# Patient Record
Sex: FEMALE | Race: BLACK OR AFRICAN AMERICAN | NOT HISPANIC OR LATINO | Employment: OTHER | URBAN - METROPOLITAN AREA
[De-identification: names, ages, dates, MRNs, and addresses within clinical notes are randomized per-mention and may not be internally consistent; named-entity substitution may affect disease eponyms.]

---

## 2017-01-16 NOTE — H&P
Date: 2017    Patient: Rachel Whitman    : 1945       History:   Rachel Whitman is a 71 yr/o female resident at [facility] who presents for evaluation of hematemesis and Hemoccult-positive stool. She has had intermittently bouts of nausea and emesis that has a coffee ground appearance for the past 3 weeks. She had a syncopal episode requiring evaluation in the hospital a few weeks ago. She states she is eating well. She is having frequent normal bowel movements. She had 2 stools checked one of which was positive for occult blood. She has no known history of ulcer disease. She currently takes pantoprozole. She does not take any NSAIDs. She does take aspirin 81 milligrams daily.         The following portions of the patient's history were reviewed and updated as appropriate: allergies, current medications, past family history, past medical history, past social history, past surgical history and problem list.              Past Medical History   Diagnosis Date   • Diabetes mellitus (HCC)    • Epistaxis        Seen by    • Expressive aphasia        after stroke   • Hemiparesis (HCC)        right side after left ARVIN stroke with left carotid stenosis   • Hemiparesis due to recent stroke (HCC)        evaluated by Dr. 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   • Vitamin B12 deficiency    • Vitamin D deficiency                 Past Surgical History   Procedure Laterality Date   • Colonoscopy          refused any more colonoscopy         Current Medications:          Current Outpatient Prescriptions   Medication Sig Dispense Refill   • amLODIPine (NORVASC) 5 mg tablet Take 1 tablet by mouth  daily 30 tablet 5   • aspirin EC 81 MG EC tablet Take 81 mg by mouth daily       • atorvastatin (LIPITOR) 40 MG tablet Take 1 tablet by mouth daily 30 tablet 6   • baclofen (LIORESAL) 10 MG tablet Take 1 tablet by mouth 4 (four) times daily 120 tablet 0   • Cranberry 450 MG TABS Take 1 tablet by mouth daily 30 each 6   • famotidine (PEPCID) 20 MG tablet Take 1 tablet by mouth 2 (two) times daily 60 tablet 5   • glimepiride (AMARYL) 4 MG tablet Take 1 tablet by mouth 2 (two) times daily before meals 180 tablet 2   • Lactobacillus (ACIDOPHILUS) CHEW Chew 1 each by mouth 2 (two) times daily 100 each 3   • Lactobacillus (FLORANEX) Take 1 tablet by mouth 2 (two) times daily 60 tablet 5   • losartan (COZAAR) 100 MG tablet Take 1 tablet by mouth daily 30 tablet 5   • metFORMIN (GLUCOPHAGE) 1000 MG tablet Take 1 tablet by mouth 2 (two) times daily with meals 60 tablet 3   • ONE TOUCH ULTRA TEST test strip USE TO TEST BLOOD SUGAR TWO TIMES A  each 11   • oxybutynin (DITROPAN-XL) 5 MG 24 hr tablet Take 1 tablet by mouth daily 30 tablet 0   • potassium chloride SA (K-DUR,KLOR-CON) 20 MEQ tablet Take 1 tablet by mouth daily for 90 days 90 tablet 0   • senna-docusate (PERICOLACE) 8.6-50 MG Take 1 tablet by mouth daily 90 tablet 3   • vitamin B-12 (CYANOCOBALAMIN) 1000 MCG tablet Inject 1,000 mcg as directed daily          No current facility-administered medications for this visit.       Allergies: Lisinopril         Constitutional: Denies fever or chills   Eyes: Denies change in visual acuity   HENT: Denies nasal congestion or sore throat   Respiratory: Denies cough or shortness of breath   Cardiovascular: Denies chest pain or edema   GI: Denies abdominal pain, nausea, vomiting, bloody stools or diarrhea   : Denies dysuria   Musculoskeletal: She is unable to walk very effectively and mostly stays in a wheelchair  Integument: Denies rash   Neurologic: She denies headache. She has had a stroke and she is weak  Endocrine:  "Denies polyuria or polydipsia   Lymphatic: Denies swollen glands   Psychiatric: Denies depression or anxiety               Family History   Problem Relation Age of Onset   • Cancer, Other or Unknown Type Mother     • Cancer, Other or Unknown Type Father        Social  Social History            Social History   • Marital status:        Spouse name: N/A   • Number of children: N/A   • Years of education: N/A            Social History Main Topics   • Smoking status: Light Tobacco Smoker       Packs/day: 0.25       Start date: 5/19/1961   • Smokeless tobacco: Never Used   • Alcohol use No   • Drug use: No   • Sexual activity: Not Asked           Other Topics Concern   • None      Social History Narrative         Physical Examination:   Pulse 82 Resp 12 Ht 5' 6\" (1.676 m) Wt 186 lb (84.4 kg) SpO2 98% BMI 30.02 kg/m2     General - well developed  female, no distress, appears stated age.  HEENT - pupils are equal, conjunctiva are pink, sclera are non-icteric.  Mouth - mucous membranes are moist, speech is normal with no hoarseness.  Neck - supple, no adenopathy or masses, no JVD. The thyroid is not palpable  Chest - Normal, comfortable respiratory effort. No accessory muscle use. clear.no rhonchi. No rales.  Heart - regular rate and rhythm. No murmur, no gallop  Abdomen - soft, non-distended, no masses. No hepatosplenomegaly, non-tender,   Hernia is not present   Ext - no edema or cyanosis, capillary refill is brisk.  Skin - warm to touch, no diaphoresis. No jaundice  Gait is normal  Speech and memory seem normal     Labs were reviewed her hemoglobin was 9.1 2 days ago     Impression:  probably mild upper gi bleeding, could be peptic ulcer disease, hiatal hernia, or other source     Plan:     Egd. Her family has been contacted by the [employer] personnel and they are agreeable     CC: Jayashree Gilbert MD Varghese, Regi, MD    "

## 2017-01-17 ENCOUNTER — ANESTHESIA EVENT (OUTPATIENT)
Dept: GASTROENTEROLOGY | Facility: HOSPITAL | Age: 72
End: 2017-01-17

## 2017-01-17 ENCOUNTER — ANESTHESIA (OUTPATIENT)
Dept: GASTROENTEROLOGY | Facility: HOSPITAL | Age: 72
End: 2017-01-17

## 2017-01-17 ENCOUNTER — HOSPITAL ENCOUNTER (OUTPATIENT)
Facility: HOSPITAL | Age: 72
Setting detail: HOSPITAL OUTPATIENT SURGERY
Discharge: NURSING, REHAB OR LONG-TERM CARE | End: 2017-01-17
Attending: SURGERY | Admitting: SURGERY

## 2017-01-17 VITALS
OXYGEN SATURATION: 95 % | DIASTOLIC BLOOD PRESSURE: 54 MMHG | RESPIRATION RATE: 16 BRPM | HEART RATE: 56 BPM | HEIGHT: 66 IN | WEIGHT: 176 LBS | TEMPERATURE: 97.8 F | BODY MASS INDEX: 28.28 KG/M2 | SYSTOLIC BLOOD PRESSURE: 116 MMHG

## 2017-01-17 DIAGNOSIS — K92.2 GI BLEED: ICD-10-CM

## 2017-01-17 LAB
DEPRECATED RDW RBC AUTO: 49.3 FL (ref 37–54)
ERYTHROCYTE [DISTWIDTH] IN BLOOD BY AUTOMATED COUNT: 14.3 % (ref 11.7–13)
GLUCOSE BLDC GLUCOMTR-MCNC: 128 MG/DL (ref 70–130)
HCT VFR BLD AUTO: 33.8 % (ref 35.6–45.5)
HGB BLD-MCNC: 10.1 G/DL (ref 11.9–15.5)
MCH RBC QN AUTO: 28.4 PG (ref 26.9–32)
MCHC RBC AUTO-ENTMCNC: 29.9 G/DL (ref 32.4–36.3)
MCV RBC AUTO: 94.9 FL (ref 80.5–98.2)
PLATELET # BLD AUTO: 301 10*3/MM3 (ref 140–500)
PMV BLD AUTO: 10.5 FL (ref 6–12)
RBC # BLD AUTO: 3.56 10*6/MM3 (ref 3.9–5.2)
WBC NRBC COR # BLD: 7.72 10*3/MM3 (ref 4.5–10.7)

## 2017-01-17 PROCEDURE — 88305 TISSUE EXAM BY PATHOLOGIST: CPT | Performed by: SURGERY

## 2017-01-17 PROCEDURE — 88312 SPECIAL STAINS GROUP 1: CPT | Performed by: SURGERY

## 2017-01-17 PROCEDURE — 25010000002 PROPOFOL 10 MG/ML EMULSION: Performed by: ANESTHESIOLOGY

## 2017-01-17 PROCEDURE — 82962 GLUCOSE BLOOD TEST: CPT

## 2017-01-17 PROCEDURE — 85027 COMPLETE CBC AUTOMATED: CPT | Performed by: SURGERY

## 2017-01-17 RX ORDER — PROPOFOL 10 MG/ML
VIAL (ML) INTRAVENOUS CONTINUOUS PRN
Status: DISCONTINUED | OUTPATIENT
Start: 2017-01-17 | End: 2017-01-17 | Stop reason: SURG

## 2017-01-17 RX ORDER — SENNA AND DOCUSATE SODIUM 50; 8.6 MG/1; MG/1
1 TABLET, FILM COATED ORAL DAILY
COMMUNITY

## 2017-01-17 RX ORDER — SODIUM CHLORIDE, SODIUM LACTATE, POTASSIUM CHLORIDE, CALCIUM CHLORIDE 600; 310; 30; 20 MG/100ML; MG/100ML; MG/100ML; MG/100ML
30 INJECTION, SOLUTION INTRAVENOUS CONTINUOUS PRN
Status: DISCONTINUED | OUTPATIENT
Start: 2017-01-17 | End: 2017-01-17 | Stop reason: HOSPADM

## 2017-01-17 RX ORDER — AMLODIPINE BESYLATE 10 MG/1
10 TABLET ORAL DAILY
COMMUNITY
End: 2017-08-01

## 2017-01-17 RX ORDER — METOCLOPRAMIDE 10 MG/1
10 TABLET ORAL
COMMUNITY
End: 2017-07-27

## 2017-01-17 RX ORDER — PANTOPRAZOLE SODIUM 40 MG/1
40 TABLET, DELAYED RELEASE ORAL DAILY
COMMUNITY

## 2017-01-17 RX ORDER — SODIUM CHLORIDE 0.9 % (FLUSH) 0.9 %
1-10 SYRINGE (ML) INJECTION AS NEEDED
Status: DISCONTINUED | OUTPATIENT
Start: 2017-01-17 | End: 2017-01-17 | Stop reason: HOSPADM

## 2017-01-17 RX ORDER — PROPOFOL 10 MG/ML
VIAL (ML) INTRAVENOUS AS NEEDED
Status: DISCONTINUED | OUTPATIENT
Start: 2017-01-17 | End: 2017-01-17 | Stop reason: SURG

## 2017-01-17 RX ORDER — FERROUS SULFATE 325(65) MG
325 TABLET ORAL
COMMUNITY

## 2017-01-17 RX ADMIN — SODIUM CHLORIDE, POTASSIUM CHLORIDE, SODIUM LACTATE AND CALCIUM CHLORIDE: 600; 310; 30; 20 INJECTION, SOLUTION INTRAVENOUS at 08:09

## 2017-01-17 RX ADMIN — SODIUM CHLORIDE, POTASSIUM CHLORIDE, SODIUM LACTATE AND CALCIUM CHLORIDE 30 ML/HR: 600; 310; 30; 20 INJECTION, SOLUTION INTRAVENOUS at 08:02

## 2017-01-17 RX ADMIN — PROPOFOL 100 MG: 10 INJECTION, EMULSION INTRAVENOUS at 08:05

## 2017-01-17 RX ADMIN — PROPOFOL 100 MCG/KG/MIN: 10 INJECTION, EMULSION INTRAVENOUS at 08:05

## 2017-01-17 NOTE — IP AVS SNAPSHOT
AFTER VISIT SUMMARY             Rachel Whitman           About your hospitalization     You were admitted on:  January 17, 2017 You last received care in the:  [facility] ENDO SUITES       Procedures & Surgeries      Procedure(s) (LRB):  ESOPHAGOGASTRODUODENOSCOPY WITH BIOPSIES (N/A)     1/17/2017     Surgeon(s):  Ignacio Johnson MD  -------------------      Medications    If you or your caregiver advised us that you are currently taking a medication and that medication is marked below as “Resume”, this simply indicates that we have reviewed those medications to make sure our new therapy recommendations do not interfere.  If you have concerns about medications other than those new ones which we are prescribing today, please consult the physician who prescribed them (or your primary physician).  Our review of your home medications is not meant to indicate that we are directing their use.             Your Medications      ASK your doctor about these medications     acidophilus tablet tablet   Take 1 tablet by mouth 2 (Two) Times a Day.           amLODIPine 10 MG tablet   Take 10 mg by mouth Daily.   Commonly known as:  NORVASC           apixaban 5 MG tablet tablet   Take 5 mg by mouth 2 (Two) Times a Day.   Commonly known as:  ELIQUIS           aspirin 81 MG EC tablet   Take 81 mg by mouth Daily.           baclofen 10 MG tablet   Take 10 mg by mouth.   Commonly known as:  LIORESAL           ferrous sulfate 325 (65 FE) MG tablet   Take 325 mg by mouth Daily With Breakfast.           losartan 100 MG tablet   Take 100 mg by mouth Daily.   Commonly known as:  COZAAR           metoclopramide 10 MG tablet   Take 10 mg by mouth 4 (Four) Times a Day Before Meals & at Bedtime.   Commonly known as:  REGLAN           pantoprazole 40 MG EC tablet   Take 40 mg by mouth Daily.   Commonly known as:  PROTONIX           sennosides-docusate sodium 8.6-50 MG tablet   Take 1 tablet by mouth Daily.   Commonly known as:   SENOKOT-S                      Your Medications      ASK your doctor about these medications           Morning Noon Evening Bedtime As Needed    acidophilus tablet tablet   Take 1 tablet by mouth 2 (Two) Times a Day.                                amLODIPine 10 MG tablet   Take 10 mg by mouth Daily.   Commonly known as:  NORVASC                                apixaban 5 MG tablet tablet   Take 5 mg by mouth 2 (Two) Times a Day.   Commonly known as:  ELIQUIS                                aspirin 81 MG EC tablet   Take 81 mg by mouth Daily.                                baclofen 10 MG tablet   Take 10 mg by mouth.   Commonly known as:  LIORESAL                                ferrous sulfate 325 (65 FE) MG tablet   Take 325 mg by mouth Daily With Breakfast.                                losartan 100 MG tablet   Take 100 mg by mouth Daily.   Commonly known as:  COZAAR                                metoclopramide 10 MG tablet   Take 10 mg by mouth 4 (Four) Times a Day Before Meals & at Bedtime.   Commonly known as:  REGLAN                                pantoprazole 40 MG EC tablet   Take 40 mg by mouth Daily.   Commonly known as:  PROTONIX                                sennosides-docusate sodium 8.6-50 MG tablet   Take 1 tablet by mouth Daily.   Commonly known as:  SENOKOT-S                                         Instructions for After Discharge          Discharge Instructions       For the next 24 hours patient needs to be with a responsible adult.    For 24 hours DO NOT drive, operate machinery, appliances, drink alcohol, make important decisions or sign legal documents.    Start with a light or bland diet and advance to previous diet as tolerated.    Follow recommendations on procedure report provided by your doctor.    Call Dr Johnson for problems [phone]    Problems may include but not limited to: large amounts of bleeding, trouble breathing, repeated vomiting, severe unrelieved pain, fever or chills.         Discharge References/Attachments     ESOPHAGOGASTRODUODENOSCOPY, CARE AFTER (ENGLISH)    GASTRITIS, ADULT (ENGLISH)    HIATAL HERNIA (ENGLISH)       Follow-ups for After Discharge        [employer] Signup     [facility] resmio allows you to send messages to your doctor, view your test results, renew your prescriptions, schedule appointments, and more. To sign up, go to [employer] and click on the Sign Up Now link in the New User? box. Enter your [facility] Activation Code exactly as it appears below along with the last four digits of your Social Security Number and your Date of Birth () to complete the sign-up process. If you do not sign up before the expiration date, you must request a new code.    [facility] Activation Code: [email or web address]  Expires: 2017  5:38 AM    If you have questions, you can email [email or web address] or call [phone] to talk to our [facility] staff. Remember, [facility] is NOT to be used for urgent needs. For medical emergencies, dial 911.           Summary of Your Hospitalization        Reason for Hospitalization     Your primary diagnosis was:  Not on File      Care Providers     Provider Service Role Specialty    Ignacio Johnson MD Surgery Attending Provider General Surgery    Ignacio Johnson MD Surgery Surgeon  General Surgery      Your Allergies  Date Reviewed: 2017    No active allergies      Pending Labs     Order Current Status    Tissue Exam Collected (17 0818)      Patient Belongings Returned     Document Return of Belongings Flowsheet     Were the patient bedside belongings sent home?   --   Belongings Retrieved from Security & Sent Home   --    Belongings Sent to Safe   --   Medications Retrieved from Pharmacy & Sent Home   --              More Information      Esophagogastroduodenoscopy, Care After  Refer to this sheet in the next few weeks. These instructions provide you with information about caring for yourself after your  procedure. Your health care provider may also give you more specific instructions. Your treatment has been planned according to current medical practices, but problems sometimes occur. Call your health care provider if you have any problems or questions after your procedure.  WHAT TO EXPECT AFTER THE PROCEDURE  After your procedure, it is typical to feel:  · Soreness in your throat.  · Pain with swallowing.  · Sick to your stomach (nauseous).  · Bloated.  · Dizzy.  · Fatigued.  HOME CARE INSTRUCTIONS  · Do not drive or operate machinery until directed by your health care provider.  · Take medicines only as directed by your health care provider.  SEEK MEDICAL CARE IF:   · You cannot stop coughing.  · You are not urinating at all or less than usual.  SEEK IMMEDIATE MEDICAL CARE IF:  · You have difficulty swallowing.  · You cannot eat or drink.  · You have worsening throat or chest pain.  · You have dizziness or lightheadedness or you faint.  · You have nausea or vomiting.  · You have chills.  · You have a fever.  · You have severe abdominal pain.  · You have black, tarry, or bloody stools.     This information is not intended to replace advice given to you by your health care provider. Make sure you discuss any questions you have with your health care provider.     Document Released: 12/04/2013 Document Revised: 01/08/2016 Document Reviewed: 12/04/2013  [employer] Interactive Patient Education ©2016 [employer]          Gastritis, Adult  Gastritis is soreness and swelling (inflammation) of the lining of the stomach. Gastritis can develop as a sudden onset (acute) or long-term (chronic) condition. If gastritis is not treated, it can lead to stomach bleeding and ulcers.  CAUSES   Gastritis occurs when the stomach lining is weak or damaged. Digestive juices from the stomach then inflame the weakened stomach lining. The stomach lining may be weak or damaged due to viral or bacterial infections. One common bacterial  infection is the Helicobacter pylori infection. Gastritis can also result from excessive alcohol consumption, taking certain medicines, or having too much acid in the stomach.   SYMPTOMS   In some cases, there are no symptoms. When symptoms are present, they may include:  · Pain or a burning sensation in the upper abdomen.  · Nausea.  · Vomiting.  · An uncomfortable feeling of fullness after eating.  DIAGNOSIS   Your caregiver may suspect you have gastritis based on your symptoms and a physical exam. To determine the cause of your gastritis, your caregiver may perform the following:  · Blood or stool tests to check for the H pylori bacterium.  · Gastroscopy. A thin, flexible tube (endoscope) is passed down the esophagus and into the stomach. The endoscope has a light and camera on the end. Your caregiver uses the endoscope to view the inside of the stomach.  · Taking a tissue sample (biopsy) from the stomach to examine under a microscope.  TREATMENT   Depending on the cause of your gastritis, medicines may be prescribed. If you have a bacterial infection, such as an H pylori infection, antibiotics may be given. If your gastritis is caused by too much acid in the stomach, H2 blockers or antacids may be given. Your caregiver may recommend that you stop taking aspirin, ibuprofen, or other nonsteroidal anti-inflammatory drugs (NSAIDs).  HOME CARE INSTRUCTIONS  · Only take over-the-counter or prescription medicines as directed by your caregiver.  · If you were given antibiotic medicines, take them as directed. Finish them even if you start to feel better.  · Drink enough fluids to keep your urine clear or pale yellow.  · Avoid foods and drinks that make your symptoms worse, such as:    Caffeine or alcoholic drinks.    Chocolate.    Peppermint or mint flavorings.    Garlic and onions.    Spicy foods.    Citrus fruits, such as oranges, lai, or limes.    Tomato-based foods such as sauce, chili, salsa, and pizza.    Fried  and fatty foods.  · Eat small, frequent meals instead of large meals.  SEEK IMMEDIATE MEDICAL CARE IF:   · You have black or dark red stools.  · You vomit blood or material that looks like coffee grounds.  · You are unable to keep fluids down.  · Your abdominal pain gets worse.  · You have a fever.  · You do not feel better after 1 week.  · You have any other questions or concerns.  MAKE SURE YOU:  · Understand these instructions.  · Will watch your condition.  · Will get help right away if you are not doing well or get worse.     This information is not intended to replace advice given to you by your health care provider. Make sure you discuss any questions you have with your health care provider.     Document Released: 12/12/2002 Document Revised: 06/18/2013 Document Reviewed: 01/30/2013  "[employer] Interactive Patient Education ©2016 "[employer] Inc.          Hiatal Hernia  A hiatal hernia occurs when part of your stomach slides above the muscle that separates your abdomen from your chest (diaphragm). You can be born with a hiatal hernia (congenital), or it may develop over time. In almost all cases of hiatal hernia, only the top part of the stomach pushes through.   Many people have a hiatal hernia with no symptoms. The larger the hernia, the more likely that you will have symptoms. In some cases, a hiatal hernia allows stomach acid to flow back into the tube that carries food from your mouth to your stomach (esophagus). This may cause heartburn symptoms. Severe heartburn symptoms may mean you have developed a condition called gastroesophageal reflux disease (GERD).   CAUSES   Hiatal hernias are caused by a weakness in the opening (hiatus) where your esophagus passes through your diaphragm to attach to the upper part of your stomach. You may be born with a weakness in your hiatus, or a weakness can develop.  RISK FACTORS  Older age is a major risk factor for a hiatal hernia. Anything that increases pressure on your  diaphragm can also increase your risk of a hiatal hernia. This includes:  · Pregnancy.  · Excess weight.  · Frequent constipation.  SIGNS AND SYMPTOMS   People with a hiatal hernia often have no symptoms. If symptoms develop, they are almost always caused by GERD. They may include:  · Heartburn.  · Belching.  · Indigestion.  · Trouble swallowing.  · Coughing or wheezing.   · Sore throat.  · Hoarseness.  · Chest pain.  DIAGNOSIS   A hiatal hernia is sometimes found during an exam for another problem. Your health care provider may suspect a hiatal hernia if you have symptoms of GERD. Tests may be done to diagnose GERD. These may include:  · X-rays of your stomach or chest.  · An upper gastrointestinal (GI) series. This is an X-ray exam of your GI tract involving the use of a chalky liquid that you swallow. The liquid shows up clearly on the X-ray.  · Endoscopy. This is a procedure to look into your stomach using a thin, flexible tube that has a tiny camera and light on the end of it.  TREATMENT   If you have no symptoms, you may not need treatment. If you have symptoms, treatment may include:  · Dietary and lifestyle changes to help reduce GERD symptoms.  · Medicines. These may include:    Over-the-counter antacids.    Medicines that make your stomach empty more quickly.    Medicines that block the production of stomach acid (H2 blockers).    Stronger medicines to reduce stomach acid (proton pump inhibitors).  · You may need surgery to repair the hernia if other treatments are not helping.  HOME CARE INSTRUCTIONS   · Take all medicines as directed by your health care provider.  · Quit smoking, if you smoke.  · Try to achieve and maintain a healthy body weight.  · Eat frequent small meals instead of three large meals a day. This keeps your stomach from getting too full.    Eat slowly.    Do not lie down right after eating.     Do not eat 1-2 hours before bed.    · Do not drink beverages with caffeine. These include  cola, coffee, cocoa, and tea.  · Do not drink alcohol.  · Avoid foods that can make symptoms of GERD worse. These may include:    Fatty foods.    Citrus fruits.    Other foods and drinks that contain acid.  · Avoid putting pressure on your belly. Anything that puts pressure on your belly increases the amount of acid that may be pushed up into your esophagus.      Avoid bending over, especially after eating.    Raise the head of your bed by putting blocks under the legs. This keeps your head and esophagus higher than your stomach.    Do not wear tight clothing around your chest or stomach.    Try not to strain when having a bowel movement, when urinating, or when lifting heavy objects.  SEEK MEDICAL CARE IF:  · Your symptoms are not controlled with medicines or lifestyle changes.  · You are having trouble swallowing.  · You have coughing or wheezing that will not go away.  SEEK IMMEDIATE MEDICAL CARE IF:  · Your pain is getting worse.  · Your pain spreads to your arms, neck, jaw, teeth, or back.  · You have shortness of breath.  · You sweat for no reason.  · You feel sick to your stomach (nauseous) or vomit.  · You vomit blood.  · You have bright red blood in your stools.  · You have black, tarry stools.       This information is not intended to replace advice given to you by your health care provider. Make sure you discuss any questions you have with your health care provider.     Document Released: 03/09/2005 Document Revised: 01/08/2016 Document Reviewed: 12/05/2014  [employer] Interactive Patient Education ©2016 [employer]            SYMPTOMS OF A STROKE    Call 911 or have someone take you to the Emergency Department if you have any of the following:    · Sudden numbness or weakness of your face, arm or leg especially on one side of the body  · Sudden confusion, diffiiculty speaking or trouble understanding   · Changes in your vision or loss of sight in one eye  · Sudden severe headache with no known  cause  · sudden dizziness, trouble walking, loss of balance or coordination    It is important to seek emergency care right away if you have further stroke symptoms. If you get emergency help quickly, the powerful clot-dissolving medicines can reduce the disabilities caused by a stroke.     For more information:    American Stroke Association  9-926-9-STROKE  www.strokeassociation.org           IF YOU SMOKE OR USE TOBACCO PLEASE READ THE FOLLOWING:    Why is smoking bad for me?  Smoking increases the risk of heart disease, lung disease, vascular disease, stroke, and cancer.     If you smoke, STOP!    If you would like more information on quitting smoking, please visit the [employer] website: [email or web address]   This link will provide additional resources including the QUIT line and the Beat the Pack support groups.     For more information:    American Cancer Society  (691) 990-8354    American Heart Association  2-875-464-9752               YOU ARE THE MOST IMPORTANT FACTOR IN YOUR RECOVERY.     Follow all instructions carefully.     I have reviewed my discharge instructions with my nurse, including the following information, if applicable:     Information about my illness and diagnosis   Follow up appointments (including lab draws)   Wound Care   Equipment Needs   Medications (new and continuing) along with side effects   Preventative information such as vaccines and smoking cessations   Diet   Pain   I know when to contact my Doctor's office or seek emergency care      I want my nurse to describe the side effects of my medications: YES NO   If the answer is no, I understand the side effects of my medications: YES NO   My nurse described the side effects of my medications in a way that I could understand: YES NO   I have taken my personal belongings and my own medications with me at discharge: YES NO            I have received this information and my questions  have been answered. I have discussed any concerns I see with this plan with the nurse or physician. I understand these instructions.    Signature of Patient or Responsible Person: _____________________________________    Date: _________________  Time: __________________    Signature of Healthcare Provider: _______________________________________  Date: _________________  Time: __________________

## 2017-01-17 NOTE — NURSING NOTE
[facility] ambulance here, patient taken to [facility]. Caretaker, Sana Mcmahan, at side. MD orders and DC instructions given to Sana.

## 2017-01-17 NOTE — OP NOTE
Endoscopic Gastroduodenoscopy Procedure Note    Rachel Whitman  1945  [id number]  1/17/2017    Pre-operative Diagnosis: recent gi bleed     Post-operative Diagnosis: gastritis, hiatal hernia, esophageal diverticulum, possible lipoma of the duodenum    Procedure: Endoscopic Gastroduodenoscopy with biopsy,     Surgeon: Ignacio Johnson MD    Indications: recent coffee ground emesis and hgb of 9.1 on Ezekiel 3, 2017    Sedation:  Mac  Procedure Details:   Informed consent was obtained for the procedure, including conscious sedation. Risks of infection, perforation, hemorrhage, and adverse drug reaction were discussed. The patient was monitored continuously with ECG tracing, pulse oximetry, blood pressure monitoring, and direct observation. The patient was placed in the left lateral decubitus position.  She was given intravenous sedation with mac..  The gastroscope was inserted into the mouth and advanced under direct vision to second portion of the duodenum. A careful inspection was made as the gastroscope was withdrawn, including a retroflexed view of the proximal stomach; findings and interventions are described below.     The duodenum appeared normal except for a subcentimeter nodule adjacent to the ampulla.  Biopsy was taken    A biopsy was taken from the antrum which appeared to show mild gastritis    A  small hiatal hernia  was  Seen.    The esophagus appeared normal except for a small midesophageal diverticulum        Specimens:   ID Type Source Tests Collected by Time Destination   A : ANTRUM BIOPSIES Tissue Gastric, Antrum TISSUE EXAM Ignacio Johnson MD 1/17/2017 0818    B : DUODENAL BIOPSIES Tissue Small Intestine, Duodenum TISSUE EXAM Ignacio Johnson MD 1/17/2017 0819               Complications:  none    Recommendations:  -Continue acid suppression., -Await pathology.  Will check cbc today  - Repeat egd in 3 months

## 2017-01-17 NOTE — NURSING NOTE
Phlebotomist here to draw CBC. [facility] called to pick-up patient. Report given to caretaker, Sana Eastman at bedside.

## 2017-01-17 NOTE — ANESTHESIA POSTPROCEDURE EVALUATION
Patient: Rachel Whitman    Procedure Summary     Date Anesthesia Start Anesthesia Stop Room / Location    01/17/17 0808 0829  [facility] /  [facility]       Procedure Diagnosis Surgeon Provider    ESOPHAGOGASTRODUODENOSCOPY WITH BIOPSIES (N/A Esophagus) No diagnosis on file. MD John Callahan MD          Anesthesia Type: MAC  Last vitals  /66 (01/17/17 0736)    Temp 36.6 °C (97.8 °F) (01/17/17 0736)    Pulse 64 (01/17/17 0736)   Resp 12 (01/17/17 0736)    SpO2 99 % (01/17/17 0736)      Post Anesthesia Care and Evaluation    Patient location during evaluation: bedside  Level of consciousness: awake  Pain score: 1  Pain management: adequate  Airway patency: patent  Anesthetic complications: No anesthetic complications    Cardiovascular status: acceptable  Respiratory status: acceptable  Hydration status: acceptable

## 2017-01-17 NOTE — ANESTHESIA PREPROCEDURE EVALUATION
Anesthesia Evaluation      Airway   Mallampati: I  TM distance: <3 FB  Neck ROM: full  no difficulty expected  Dental - normal exam     Pulmonary - normal exam   (+) pulmonary embolism, sleep apnea,   Cardiovascular - normal exam    Neuro/Psych  (+) CVA, syncope, psychiatric history Depression, dementia,    GI/Hepatic/Renal/Endo    (+)  GERD, diabetes mellitus,     Musculoskeletal     Abdominal  - normal exam    Bowel sounds: normal.   Substance History      OB/GYN          Other                             Anesthesia Plan    ASA 3     MAC     Anesthetic plan and risks discussed with patient.

## 2017-01-17 NOTE — DISCHARGE INSTRUCTIONS
For the next 24 hours patient needs to be with a responsible adult.    For 24 hours DO NOT drive, operate machinery, appliances, drink alcohol, make important decisions or sign legal documents.    Start with a light or bland diet and advance to previous diet as tolerated.    Follow recommendations on procedure report provided by your doctor.    Call Dr Johnson for problems [phone]    Problems may include but not limited to: large amounts of bleeding, trouble breathing, repeated vomiting, severe unrelieved pain, fever or chills.

## 2017-01-18 LAB
CYTO UR: NORMAL
LAB AP CASE REPORT: NORMAL
Lab: NORMAL
PATH REPORT.FINAL DX SPEC: NORMAL
PATH REPORT.GROSS SPEC: NORMAL

## 2017-06-11 ENCOUNTER — APPOINTMENT (OUTPATIENT)
Dept: GENERAL RADIOLOGY | Facility: HOSPITAL | Age: 72
End: 2017-06-11

## 2017-06-11 ENCOUNTER — HOSPITAL ENCOUNTER (EMERGENCY)
Facility: HOSPITAL | Age: 72
Discharge: HOME | End: 2017-06-11
Attending: EMERGENCY MEDICINE | Admitting: EMERGENCY MEDICINE

## 2017-06-11 VITALS
SYSTOLIC BLOOD PRESSURE: 144 MMHG | HEART RATE: 88 BPM | WEIGHT: 176 LBS | BODY MASS INDEX: 29.32 KG/M2 | TEMPERATURE: 98.8 F | DIASTOLIC BLOOD PRESSURE: 75 MMHG | HEIGHT: 65 IN | OXYGEN SATURATION: 96 % | RESPIRATION RATE: 16 BRPM
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
## 2017-06-11 DIAGNOSIS — S82.025A CLOSED NONDISPLACED LONGITUDINAL FRACTURE OF LEFT PATELLA, INITIAL ENCOUNTER: ICD-10-CM

## 2017-06-11 PROCEDURE — 73562 X-RAY EXAM OF KNEE 3: CPT

## 2017-06-11 PROCEDURE — 99284 EMERGENCY DEPT VISIT MOD MDM: CPT

## 2017-06-11 NOTE — ED NOTES
S/w [facility] at [facility]. Pt  should be expected around 2300 via their service.     Siddharth Silvestre RN  06/11/17 1929

## 2017-06-11 NOTE — ED NOTES
Fell at nursing home yesterday. Xray was done today and reveal fracture of left patella.     Eli Goodwin RN  06/11/17 7439

## 2017-06-11 NOTE — ED PROVIDER NOTES
71 y/o female with h/o dementia and stroke presents to the ED with left sided knee pain s/p fall three days ago.   Ortho - Dr. Sanchez    EXAM:  Pleasantly confused, awake, alert in NAD  Swollen and tender left knee, unable to lift left foot off bed, trace edema to LLE. NV intact distally.     PLAN:  Reviewed Left Knee XR which showed patellar fx. Will consult with Dr. Sanchez who is the pt's ortho.   Pt placed in knee immobilizer for outpt f/u      I supervised care provided by the midlevel provider.    We have discussed this patient's history, physical exam, and treatment plan.   I have reviewed the note and personally saw and examined the patient and agree with the plan of care.    Documentation assistance provided by karl Vanegas.  Information recorded by the scribe was done at my direction and has been verified and validated by me.       Brad Vaengas  06/11/17 1847       Barber aVng MD  06/12/17 1257

## 2017-06-11 NOTE — ED PROVIDER NOTES
EMERGENCY DEPARTMENT ENCOUNTER    CHIEF COMPLAINT  Chief Complaint: L knee pain  History given by: pt and nursing home  History limited by: Chronic AMS/Dementia  Room Number: 21/21  PMD: Jayashree Gilbert MD      HPI:  Pt is a 72 y.o. female who presents complaining of L knee pain s/p fall 3 days ago. Pt denies hitting her head, LOC, neck or back pain. Pt  States that she is normally ambulatory.     Duration:  3 days  Onset: s/p fall  Timing: constant  Location: L knee  Radiation: none stated  Quality: pain  Intensity/Severity: moderate  Progression: unchanged  Associated Symptoms: none   Aggravating Factors: none stated  Alleviating Factors: none stated   Previous Episodes: none stated   Treatment before arrival: none     PAST MEDICAL HISTORY  Active Ambulatory Problems     Diagnosis Date Noted   • Hip fracture, left 10/03/2016   • Hypertension 10/03/2016   • Syncope 11/09/2016     Resolved Ambulatory Problems     Diagnosis Date Noted   • No Resolved Ambulatory Problems     Past Medical History:   Diagnosis Date   • Arthritis    • Constipation    • Dementia    • Diabetes mellitus    • Fx. left wrist    • GERD (gastroesophageal reflux disease)    • High cholesterol    • Hypertension    • Hypokalemia    • Obstructive sleep apnea    • Pulmonary embolism    • Stroke 03/01/2016       PAST SURGICAL HISTORY  Past Surgical History:   Procedure Laterality Date   • APPENDECTOMY     • CARPAL TUNNEL RELEASE     • ENDOSCOPY N/A 1/17/2017    Procedure: ESOPHAGOGASTRODUODENOSCOPY WITH BIOPSIES;  Surgeon: Ignacio Johnson MD;  Location: [facility] ENDOSCOPY;  Service:    • FRACTURE SURGERY     • SC OPEN FIX INTER/SUBTROCH FX,IMPLNT Left 10/4/2016    Procedure: HIP INTERTROCHANTERIC NAILING;  Surgeon: Monica Sanchez MD;  Location: [facility] MAIN OR;  Service: Orthopedics       FAMILY HISTORY  Family History   Problem Relation Age of Onset   • Diabetes Mother        SOCIAL HISTORY  Social History     Social History   • Marital  status:      Spouse name: N/A   • Number of children: N/A   • Years of education: N/A     Occupational History   • Not on file.     Social History Main Topics   • Smoking status: Former Smoker     Types: Cigarettes     Start date: 10/3/1960     Quit date: 11/21/2015   • Smokeless tobacco: Not on file   • Alcohol use Not on file   • Drug use: Not on file   • Sexual activity: Not on file     Other Topics Concern   • Not on file     Social History Narrative       ALLERGIES  Review of patient's allergies indicates no known allergies.    REVIEW OF SYSTEMS  Review of Systems   Constitutional: Negative for fever.   HENT: Negative for sore throat.    Eyes: Negative.    Respiratory: Negative for cough and shortness of breath.    Cardiovascular: Negative for chest pain.   Gastrointestinal: Negative for abdominal pain, diarrhea and vomiting.   Genitourinary: Negative for dysuria.   Musculoskeletal: Negative for neck pain. Arthralgias: L knee.   Skin: Negative for rash.   Allergic/Immunologic: Negative.    Neurological: Negative for weakness, numbness and headaches.   Hematological: Negative.    Psychiatric/Behavioral: Negative.    All other systems reviewed and are negative.      PHYSICAL EXAM  ED Triage Vitals   Temp Heart Rate Resp BP SpO2   06/11/17 1721 06/11/17 1721 06/11/17 1721 06/11/17 1728 06/11/17 1721   98.8 °F (37.1 °C) 88 16 144/75 96 %      Temp src Heart Rate Source Patient Position BP Location FiO2 (%)   -- -- -- -- --              Physical Exam   Constitutional: She is oriented to person, place, and time and well-developed, well-nourished, and in no distress. No distress.   HENT:   Head: Normocephalic and atraumatic.   Eyes: EOM are normal. Pupils are equal, round, and reactive to light.   Neck: Normal range of motion. Neck supple.   Cardiovascular: Normal rate, regular rhythm and normal heart sounds.    Pulmonary/Chest: Effort normal and breath sounds normal. No respiratory distress.   Abdominal:  Soft. There is no tenderness. There is no rebound and no guarding.   Musculoskeletal: Normal range of motion. She exhibits edema (L patellar).   Limited ROM L knee    Neurological: She is alert and oriented to person, place, and time. She has normal sensation and normal strength.   pleasantly confused    Skin: Skin is warm and dry. No rash noted.   Psychiatric: Mood and affect normal.   Nursing note and vitals reviewed.      RADIOLOGY  XR Knee 3 View Left   Final Result   1. PATELLAR FRACTURE with mild separation of superior fracture fragment.  2. Large joint effusion likely hemarthrosis.  3. The long intramedullary octavio within the right femur is intact.  4. Prominent prepatellar soft tissue swelling.  5. Prominent vascular calcification and relative lateral compartmental  narrowing.        I ordered the above noted radiological studies. Interpreted by radiologist. Reviewed by me in PACS.       PROCEDURES  Procedures      PROGRESS AND CONSULTS  ED Course   1842  Discussed pt with Dr. Vang. Dr. Vang agrees with the plan of care.  Pt placed in knee immobilizer.          MEDICAL DECISION MAKING  Results were reviewed/discussed with the patient and they were also made aware of online access. Pt also made aware that some labs, such as cultures, will not be resulted during ER visit and follow up with PMD is necessary.     MDM  Number of Diagnoses or Management Options     Amount and/or Complexity of Data Reviewed  Tests in the radiology section of CPT®: ordered and reviewed (XR L knee: patellar fracture with mild separation of the superior fragment. )    Patient Progress  Patient progress: stable         DIAGNOSIS  Final diagnoses:   Fall, initial encounter   Closed nondisplaced longitudinal fracture of left patella, initial encounter       DISPOSITION  DISCHARGE    Patient discharged in stable condition.    Reviewed implications of results, diagnosis, meds, responsibility to follow up, warning signs and symptoms of  possible worsening, potential complications and reasons to return to ER.    Patient/Family voiced understanding of above instructions.    Discussed plan for discharge, as there is no emergent indication for admission.  Pt/family is agreeable and understands need for follow up and repeat testing.  Pt is aware that discharge does not mean that nothing is wrong but it indicates no emergency is present that requires admission and they must continue care with follow-up as given below or physician of their choice.     FOLLOW-UP  Monica Sanchez MD  [address] [facility] [address]  [place] [address]  [phone]    Schedule an appointment as soon as possible for a visit           Medication List      Notice     No changes were made to your prescriptions during this visit.            Latest Documented Vital Signs:  As of 7:03 PM  BP- 144/75 HR- 88 Temp- 98.8 °F (37.1 °C) O2 sat- 96%    --  Documentation assistance provided by karl Miles for Nilesh Barron.  Information recorded by the rosyibqing was done at my direction and has been verified and validated by me.     Ata Miles  06/11/17 7645       LINDA Brizuela  06/12/17 5934

## 2017-06-12 ENCOUNTER — APPOINTMENT (OUTPATIENT)
Dept: GENERAL RADIOLOGY | Facility: HOSPITAL | Age: 72
End: 2017-06-12

## 2017-06-12 ENCOUNTER — HOSPITAL ENCOUNTER (EMERGENCY)
Facility: HOSPITAL | Age: 72
Discharge: HOME | End: 2017-06-12
Attending: EMERGENCY MEDICINE | Admitting: EMERGENCY MEDICINE

## 2017-06-12 VITALS
SYSTOLIC BLOOD PRESSURE: 123 MMHG | HEIGHT: 66 IN | BODY MASS INDEX: 24.11 KG/M2 | OXYGEN SATURATION: 96 % | WEIGHT: 150 LBS | DIASTOLIC BLOOD PRESSURE: 61 MMHG | TEMPERATURE: 100.3 F | RESPIRATION RATE: 18 BRPM | HEART RATE: 78 BPM

## 2017-06-12 DIAGNOSIS — R50.9 FEVER AND CHILLS: Primary | ICD-10-CM

## 2017-06-12 DIAGNOSIS — N39.0 UTI (URINARY TRACT INFECTION), BACTERIAL: ICD-10-CM

## 2017-06-12 DIAGNOSIS — A49.9 UTI (URINARY TRACT INFECTION), BACTERIAL: ICD-10-CM

## 2017-06-12 LAB
ALBUMIN SERPL-MCNC: 3.7 G/DL (ref 3.5–5.2)
ALBUMIN/GLOB SERPL: 1 G/DL
ALP SERPL-CCNC: 90 U/L (ref 39–117)
ALT SERPL W P-5'-P-CCNC: 8 U/L (ref 1–33)
ANION GAP SERPL CALCULATED.3IONS-SCNC: 13.2 MMOL/L
AST SERPL-CCNC: 10 U/L (ref 1–32)
BACTERIA UR QL AUTO: ABNORMAL /HPF
BASOPHILS # BLD AUTO: 0.04 10*3/MM3 (ref 0–0.2)
BASOPHILS NFR BLD AUTO: 0.4 % (ref 0–1.5)
BILIRUB SERPL-MCNC: 0.4 MG/DL (ref 0.1–1.2)
BILIRUB UR QL STRIP: NEGATIVE
BUN BLD-MCNC: 12 MG/DL (ref 8–23)
BUN/CREAT SERPL: 17.1 (ref 7–25)
CALCIUM SPEC-SCNC: 9.3 MG/DL (ref 8.6–10.5)
CHLORIDE SERPL-SCNC: 98 MMOL/L (ref 98–107)
CLARITY UR: ABNORMAL
CO2 SERPL-SCNC: 26.8 MMOL/L (ref 22–29)
COLOR UR: YELLOW
CREAT BLD-MCNC: 0.7 MG/DL (ref 0.57–1)
D-LACTATE SERPL-SCNC: 1 MMOL/L (ref 0.5–2)
DEPRECATED RDW RBC AUTO: 48 FL (ref 37–54)
EOSINOPHIL # BLD AUTO: 0.15 10*3/MM3 (ref 0–0.7)
EOSINOPHIL NFR BLD AUTO: 1.5 % (ref 0.3–6.2)
ERYTHROCYTE [DISTWIDTH] IN BLOOD BY AUTOMATED COUNT: 14.5 % (ref 11.7–13)
GFR SERPL CREATININE-BSD FRML MDRD: 100 ML/MIN/1.73
GLOBULIN UR ELPH-MCNC: 3.6 GM/DL
GLUCOSE BLD-MCNC: 154 MG/DL (ref 65–99)
GLUCOSE UR STRIP-MCNC: NEGATIVE MG/DL
HCT VFR BLD AUTO: 29.9 % (ref 35.6–45.5)
HGB BLD-MCNC: 9.6 G/DL (ref 11.9–15.5)
HGB UR QL STRIP.AUTO: ABNORMAL
HYALINE CASTS UR QL AUTO: ABNORMAL /LPF
IMM GRANULOCYTES # BLD: 0.04 10*3/MM3 (ref 0–0.03)
IMM GRANULOCYTES NFR BLD: 0.4 % (ref 0–0.5)
KETONES UR QL STRIP: NEGATIVE
LEUKOCYTE ESTERASE UR QL STRIP.AUTO: ABNORMAL
LYMPHOCYTES # BLD AUTO: 1.85 10*3/MM3 (ref 0.9–4.8)
LYMPHOCYTES NFR BLD AUTO: 18.2 % (ref 19.6–45.3)
MCH RBC QN AUTO: 29.3 PG (ref 26.9–32)
MCHC RBC AUTO-ENTMCNC: 32.1 G/DL (ref 32.4–36.3)
MCV RBC AUTO: 91.2 FL (ref 80.5–98.2)
MONOCYTES # BLD AUTO: 1.16 10*3/MM3 (ref 0.2–1.2)
MONOCYTES NFR BLD AUTO: 11.4 % (ref 5–12)
NEUTROPHILS # BLD AUTO: 6.93 10*3/MM3 (ref 1.9–8.1)
NEUTROPHILS NFR BLD AUTO: 68.1 % (ref 42.7–76)
NITRITE UR QL STRIP: NEGATIVE
PH UR STRIP.AUTO: 7.5 [PH] (ref 5–8)
PLATELET # BLD AUTO: 257 10*3/MM3 (ref 140–500)
PMV BLD AUTO: 10.2 FL (ref 6–12)
POTASSIUM BLD-SCNC: 3.8 MMOL/L (ref 3.5–5.2)
PROCALCITONIN SERPL-MCNC: 0.08 NG/ML (ref 0.1–0.25)
PROT SERPL-MCNC: 7.3 G/DL (ref 6–8.5)
PROT UR QL STRIP: NEGATIVE
RBC # BLD AUTO: 3.28 10*6/MM3 (ref 3.9–5.2)
RBC # UR: ABNORMAL /HPF
REF LAB TEST METHOD: ABNORMAL
SODIUM BLD-SCNC: 138 MMOL/L (ref 136–145)
SP GR UR STRIP: 1.01 (ref 1–1.03)
SQUAMOUS #/AREA URNS HPF: ABNORMAL /HPF
UROBILINOGEN UR QL STRIP: ABNORMAL
WBC NRBC COR # BLD: 10.17 10*3/MM3 (ref 4.5–10.7)
WBC UR QL AUTO: ABNORMAL /HPF

## 2017-06-12 PROCEDURE — 87086 URINE CULTURE/COLONY COUNT: CPT | Performed by: EMERGENCY MEDICINE

## 2017-06-12 PROCEDURE — 99284 EMERGENCY DEPT VISIT MOD MDM: CPT

## 2017-06-12 PROCEDURE — 85025 COMPLETE CBC W/AUTO DIFF WBC: CPT | Performed by: EMERGENCY MEDICINE

## 2017-06-12 PROCEDURE — 87040 BLOOD CULTURE FOR BACTERIA: CPT | Performed by: EMERGENCY MEDICINE

## 2017-06-12 PROCEDURE — 80053 COMPREHEN METABOLIC PANEL: CPT | Performed by: EMERGENCY MEDICINE

## 2017-06-12 PROCEDURE — 25010000003 CEFTRIAXONE PER 250 MG: Performed by: EMERGENCY MEDICINE

## 2017-06-12 PROCEDURE — 81001 URINALYSIS AUTO W/SCOPE: CPT | Performed by: EMERGENCY MEDICINE

## 2017-06-12 PROCEDURE — 96365 THER/PROPH/DIAG IV INF INIT: CPT

## 2017-06-12 PROCEDURE — 71010 HC CHEST PA OR AP: CPT

## 2017-06-12 PROCEDURE — 83605 ASSAY OF LACTIC ACID: CPT | Performed by: EMERGENCY MEDICINE

## 2017-06-12 PROCEDURE — 84145 PROCALCITONIN (PCT): CPT | Performed by: EMERGENCY MEDICINE

## 2017-06-12 PROCEDURE — 36415 COLL VENOUS BLD VENIPUNCTURE: CPT

## 2017-06-12 RX ORDER — SODIUM CHLORIDE 0.9 % (FLUSH) 0.9 %
10 SYRINGE (ML) INJECTION AS NEEDED
Status: DISCONTINUED | OUTPATIENT
Start: 2017-06-12 | End: 2017-06-12 | Stop reason: HOSPADM

## 2017-06-12 RX ORDER — SULFAMETHOXAZOLE AND TRIMETHOPRIM 800; 160 MG/1; MG/1
1 TABLET ORAL 2 TIMES DAILY
Qty: 14 TABLET | Refills: 0 | Status: SHIPPED | OUTPATIENT
Start: 2017-06-12 | End: 2017-07-27

## 2017-06-12 RX ORDER — CEFTRIAXONE SODIUM 1 G/50ML
1 INJECTION, SOLUTION INTRAVENOUS ONCE
Status: COMPLETED | OUTPATIENT
Start: 2017-06-12 | End: 2017-06-12

## 2017-06-12 RX ADMIN — CEFTRIAXONE SODIUM 1 G: 1 INJECTION, SOLUTION INTRAVENOUS at 02:08

## 2017-06-12 NOTE — ED PROVIDER NOTES
EMERGENCY DEPARTMENT ENCOUNTER    CHIEF COMPLAINT  Chief Complaint: fever  History given by: pt  History limited by: nothing  Room Number: 06/06  PMD: Jayashree Gilbert MD      HPI:  Pt is a 72 y.o. female who presents complaining of a fever earlier today. Pt was discharged from ED earlier today for knee pain with a fever of 100.3. Because of the fever, her assisted living center denied readmittance. Pt denies any pain, vomiting, diarrhea, CP or any other symptoms at this time.     Duration:  Earlier today  Onset: gradual  Timing: constant  Location: n/a  Radiation: none  Quality: fever  Intensity/Severity: 100.3  Progression: unchanged  Associated Symptoms: none  Aggravating Factors: none  Alleviating Factors: none  Previous Episodes: none  Treatment before arrival: Pt received no treatment prior to arrival.     PAST MEDICAL HISTORY  Active Ambulatory Problems     Diagnosis Date Noted   • Hip fracture, left 10/03/2016   • Hypertension 10/03/2016   • Syncope 11/09/2016     Resolved Ambulatory Problems     Diagnosis Date Noted   • No Resolved Ambulatory Problems     Past Medical History:   Diagnosis Date   • Arthritis    • Constipation    • Dementia    • Diabetes mellitus    • Fx. left wrist    • GERD (gastroesophageal reflux disease)    • High cholesterol    • Hypertension    • Hypokalemia    • Obstructive sleep apnea    • Pulmonary embolism    • Stroke 03/01/2016       PAST SURGICAL HISTORY  Past Surgical History:   Procedure Laterality Date   • APPENDECTOMY     • CARPAL TUNNEL RELEASE     • ENDOSCOPY N/A 1/17/2017    Procedure: ESOPHAGOGASTRODUODENOSCOPY WITH BIOPSIES;  Surgeon: Ignacio Johnson MD;  Location: [facility] ENDOSCOPY;  Service:    • FRACTURE SURGERY     • IL OPEN FIX INTER/SUBTROCH FX,IMPLNT Left 10/4/2016    Procedure: HIP INTERTROCHANTERIC NAILING;  Surgeon: Monica Sanchez MD;  Location: [facility] MAIN OR;  Service: Orthopedics       FAMILY HISTORY  Family History   Problem Relation Age of Onset    • Diabetes Mother        SOCIAL HISTORY  Social History     Social History   • Marital status:      Spouse name: N/A   • Number of children: N/A   • Years of education: N/A     Occupational History   • Not on file.     Social History Main Topics   • Smoking status: Former Smoker     Types: Cigarettes     Start date: 10/3/1960     Quit date: 11/21/2015   • Smokeless tobacco: Not on file   • Alcohol use Not on file   • Drug use: Not on file   • Sexual activity: Not on file     Other Topics Concern   • Not on file     Social History Narrative       ALLERGIES  Review of patient's allergies indicates no known allergies.    REVIEW OF SYSTEMS  Review of Systems   Constitutional: Positive for fever (100.3).   HENT: Negative for sore throat.    Respiratory: Negative for cough and shortness of breath.    Cardiovascular: Negative for chest pain.   Gastrointestinal: Negative for abdominal pain, diarrhea and vomiting.   Genitourinary: Negative for dysuria.   Musculoskeletal: Negative for neck pain.   Skin: Negative for rash.   Allergic/Immunologic: Negative.    Neurological: Negative for weakness, numbness and headaches.   Hematological: Negative.    Psychiatric/Behavioral: Negative.    All other systems reviewed and are negative.      PHYSICAL EXAM  ED Triage Vitals   Temp Heart Rate Resp BP SpO2   06/12/17 0011 06/12/17 0011 06/12/17 0011 06/12/17 0011 06/12/17 0011   100.3 °F (37.9 °C) 83 18 132/80 96 %      Temp src Heart Rate Source Patient Position BP Location FiO2 (%)   06/12/17 0011 06/12/17 0011 06/12/17 0011 06/12/17 0011 --   Tympanic Monitor Sitting Right arm        Physical Exam   Constitutional: She is oriented to person, place, and time and well-developed, well-nourished, and in no distress. No distress.   HENT:   Head: Normocephalic and atraumatic.   Eyes: EOM are normal. Pupils are equal, round, and reactive to light.   Neck: Normal range of motion. Neck supple.   Cardiovascular: Normal rate, regular  rhythm and normal heart sounds.    Pulmonary/Chest: Effort normal and breath sounds normal. No respiratory distress.   Abdominal: Soft. There is no tenderness. There is no rebound and no guarding.   Musculoskeletal: She exhibits no edema.   Pt is in a knee imobilizer to left lower extremity.     Neurological: She is alert and oriented to person, place, and time. She has normal sensation and normal strength.   Skin: Skin is warm and dry. No rash noted.   Psychiatric: Mood and affect normal.   Nursing note and vitals reviewed.      LAB RESULTS  Lab Results (last 24 hours)     Procedure Component Value Units Date/Time    Blood Culture [[id number]] Collected:  06/12/17 0020    Specimen:  Blood from Arm, Right Updated:  06/12/17 0059    CBC & Differential [[id number]] Collected:  06/12/17 0046    Specimen:  Blood Updated:  06/12/17 0103    Narrative:       The following orders were created for panel order CBC & Differential.  Procedure                               Abnormality         Status                     ---------                               -----------         ------                     CBC Auto Differential[[id number]]        Abnormal            Final result                 Please view results for these tests on the individual orders.    Blood Culture [[id number]] Collected:  06/12/17 0046    Specimen:  Blood from Arm, Left Updated:  06/12/17 0059    Lactic Acid, Plasma [[id number]]  (Normal) Collected:  06/12/17 0046    Specimen:  Blood Updated:  06/12/17 0109     Lactate 1.0 mmol/L     CBC Auto Differential [[id number]]  (Abnormal) Collected:  06/12/17 0046    Specimen:  Blood Updated:  06/12/17 0103     WBC 10.17 10*3/mm3      RBC 3.28 (L) 10*6/mm3      Hemoglobin 9.6 (L) g/dL      Hematocrit 29.9 (L) %      MCV 91.2 fL      MCH 29.3 pg      MCHC 32.1 (L) g/dL      RDW 14.5 (H) %      RDW-SD 48.0 fl      MPV 10.2 fL      Platelets 257 10*3/mm3      Neutrophil % 68.1 %      Lymphocyte % 18.2 (L) %      Monocyte % 11.4  %      Eosinophil % 1.5 %      Basophil % 0.4 %      Immature Grans % 0.4 %      Neutrophils, Absolute 6.93 10*3/mm3      Lymphocytes, Absolute 1.85 10*3/mm3      Monocytes, Absolute 1.16 10*3/mm3      Eosinophils, Absolute 0.15 10*3/mm3      Basophils, Absolute 0.04 10*3/mm3      Immature Grans, Absolute 0.04 (H) 10*3/mm3     Comprehensive Metabolic Panel [[id number]]  (Abnormal) Collected:  06/12/17 0047    Specimen:  Blood Updated:  06/12/17 0125     Glucose 154 (H) mg/dL      BUN 12 mg/dL      Creatinine 0.70 mg/dL      Sodium 138 mmol/L      Potassium 3.8 mmol/L      Chloride 98 mmol/L      CO2 26.8 mmol/L      Calcium 9.3 mg/dL      Total Protein 7.3 g/dL      Albumin 3.70 g/dL      ALT (SGPT) 8 U/L      AST (SGOT) 10 U/L      Alkaline Phosphatase 90 U/L      Total Bilirubin 0.4 mg/dL      eGFR  African Amer 100 mL/min/1.73      Globulin 3.6 gm/dL      A/G Ratio 1.0 g/dL      BUN/Creatinine Ratio 17.1     Anion Gap 13.2 mmol/L     Narrative:       The MDRD GFR formula is only valid for adults with stable renal function between ages 18 and 70.    Urinalysis With / Culture If Indicated [[id number]]  (Abnormal) Collected:  06/12/17 0047    Specimen:  Urine from Urine, Catheter Updated:  06/12/17 0102     Color, UA Yellow     Appearance, UA Cloudy (A)     pH, UA 7.5     Specific Gravity, UA 1.012     Glucose, UA Negative     Ketones, UA Negative     Bilirubin, UA Negative     Blood, UA Small (1+) (A)     Protein, UA Negative     Leuk Esterase, UA Large (3+) (A)     Nitrite, UA Negative     Urobilinogen, UA 0.2 E.U./dL    Procalcitonin [[id number]]  (Abnormal) Collected:  06/12/17 0047    Specimen:  Blood Updated:  06/12/17 0132     Procalcitonin 0.08 (L) ng/mL     Narrative:       As a Marker for Sepsis (Non-Neonates):   1. <0.5 ng/mL represents a low risk of severe sepsis and/or septic shock.  1. >2 ng/mL represents a high risk of severe sepsis and/or septic shock.    As a Marker for Lower Respiratory Tract Infections  "that require antibiotic therapy:  PCT on Admission     Antibiotic Therapy             6-12 Hrs later  > 0.5                Strongly Recommended            >0.25 - <0.5         Recommended  0.1 - 0.25           Discouraged                   Remeasure/reassess PCT  <0.1                 Strongly Discouraged          Remeasure/reassess PCT      As 28 day mortality risk marker: \"Change in Procalcitonin Result\" (> 80 % or <=80 %) if Day 0 (or Day 1) and Day 4 values are available. Refer to http://[email or web address]-pct-[email or web address]/   Change in PCT <=80 %   A decrease of PCT levels below or equal to 80 % defines a positive change in PCT test result representing a higher risk for 28-day all-cause mortality of patients diagnosed with severe sepsis or septic shock.  Change in PCT > 80 %   A decrease of PCT levels of more than 80 % defines a negative change in PCT result representing a lower risk for 28-day all-cause mortality of patients diagnosed with severe sepsis or septic shock.                Urinalysis, Microscopic Only [[id number]]  (Abnormal) Collected:  06/12/17 0047    Specimen:  Urine from Urine, Catheter Updated:  06/12/17 0103     RBC, UA 3-5 (A) /HPF      WBC, UA Too Numerous to Count (A) /HPF      Bacteria, UA 1+ (A) /HPF      Squamous Epithelial Cells, UA 0-2 /HPF      Hyaline Casts, UA 7-12 /LPF      Methodology Automated Microscopy    Urine Culture [[id number]] Collected:  06/12/17 0047    Specimen:  Urine from Urine, Catheter Updated:  06/12/17 0101          I ordered the above labs and reviewed the results    RADIOLOGY  XR Chest 1 View    (Results Pending)   CXR shows NAD    I ordered the above noted radiological studies. Interpreted by radiologist. Discussed with radiologist Dr. Faye. Reviewed by me in PACS.       PROCEDURES  Procedures      PROGRESS AND CONSULTS  ED Course       1:01AM Ordered XR Chest, UA, blood work, CBC, procalcitonin, and lactic acid for further evaluation    1:47AM Ordered Rocephin to " treat UTI    2:20AM BP- 136/78 HR- 78 Temp- 100.3 °F (37.9 °C) (Tympanic) O2 sat- 96%. Rechecked the patient who is in NAD and is resting comfortably. Informed pt of positive test results showing UTI. Plan to discharge. Pt understands and agrees with plan. All questions and concerns were addressed at this time.      MEDICAL DECISION MAKING  Results were reviewed/discussed with the patient and they were also made aware of online access. Pt also made aware that some labs, such as cultures, will not be resulted during ER visit and follow up with PMD is necessary.     MDM  Number of Diagnoses or Management Options  Fever and chills:   UTI (urinary tract infection), bacterial:      Amount and/or Complexity of Data Reviewed  Clinical lab tests: reviewed and ordered (Procalcitonin - .08, Lactate - 1.0, WBC - 10.17)  Tests in the radiology section of CPT®: ordered and reviewed (XR Chest shows NAD.)  Decide to obtain previous medical records or to obtain history from someone other than the patient: yes  Review and summarize past medical records: yes (Pt was seen yesterday in ED for a fall with a patellar fracture.)  Independent visualization of images, tracings, or specimens: yes           DIAGNOSIS  Final diagnoses:   Fever and chills   UTI (urinary tract infection), bacterial       DISPOSITION  DISCHARGE    Patient discharged in stable condition.    Reviewed implications of results, diagnosis, meds, responsibility to follow up, warning signs and symptoms of possible worsening, potential complications and reasons to return to ER, including new or worsening symptoms.    Patient/Family voiced understanding of above instructions.    Discussed plan for discharge, as there is no emergent indication for admission.  Pt/family is agreeable and understands need for follow up and repeat testing.  Pt is aware that discharge does not mean that nothing is wrong but it indicates no emergency is present that requires admission and they  must continue care with follow-up as given below or physician of their choice.     FOLLOW-UP  Jayashree Gilbert MD  [address]   [address]  [place] [address]  [phone]    Schedule an appointment as soon as possible for a visit           Medication List      New Prescriptions          sulfamethoxazole-trimethoprim 800-160 MG per tablet   Commonly known as:  BACTRIM DS   Take 1 tablet by mouth 2 (Two) Times a Day.                Latest Documented Vital Signs:  As of 6:57 AM  BP- 123/61 HR- 78 Temp- 100.3 °F (37.9 °C) (Tympanic) O2 sat- 96%    --  Documentation assistance provided by karl Robertson and Deirdre Skinner for Dr. Shaw.  Information recorded by the scribqing was done at my direction and has been verified and validated by me.     Juan Francisco Robertson  06/12/17 0039       Juan Francisco Robertson  06/12/17 0129       Juan Francisco Robertson  06/12/17 0232       Juan Francisco Robertson  06/12/17 0415       Juan Francisco Robertson  06/12/17 0433             Deirdre Skinner  06/12/17 0439       John Shaw MD  06/12/17 0657

## 2017-06-12 NOTE — ED NOTES
Pt to Room 13 with c/o fever of 100.3.  Pt was discharged from ED for knee pain, and her facility declined to accept her d/t the fever.  Pt is alert and oriented X4, PERRLA, respirations are even and unlabored, chest rise and fall is equal in expansion.  Pt does not appear to be in distress at this time.  Pt denies chills, n/v/d, blurred vision, chest pain, abdominal pain, loss in control of bowel/bladder.  Bed in low position, call light within reach, side rails up X2.      Luisa Martinez RN  06/12/17 0037

## 2017-06-12 NOTE — ED NOTES
Helped change pt adult brief.  Pt daughter from [place] called the ER and was updated per pt request. Pt daughter had no further questions at this.     Sheila Davis RN  06/11/17 3804

## 2017-06-13 ENCOUNTER — TRANSCRIBE ORDERS (OUTPATIENT)
Dept: ADMINISTRATIVE | Facility: HOSPITAL | Age: 72
End: 2017-06-13

## 2017-06-13 DIAGNOSIS — I63.9 IMPENDING CEREBROVASCULAR ACCIDENT (HCC): ICD-10-CM

## 2017-06-13 DIAGNOSIS — R55 SYNCOPE AND COLLAPSE: Primary | ICD-10-CM

## 2017-06-13 LAB — BACTERIA SPEC AEROBE CULT: NORMAL

## 2017-06-14 LAB
BACTERIA SPEC AEROBE CULT: ABNORMAL
GRAM STN SPEC: ABNORMAL

## 2017-06-15 ENCOUNTER — HOSPITAL ENCOUNTER (OUTPATIENT)
Dept: NEUROLOGY | Facility: HOSPITAL | Age: 72
Discharge: HOME | End: 2017-06-15
Admitting: INTERNAL MEDICINE

## 2017-06-15 DIAGNOSIS — R55 SYNCOPE AND COLLAPSE: ICD-10-CM

## 2017-06-15 DIAGNOSIS — I63.9 IMPENDING CEREBROVASCULAR ACCIDENT (HCC): ICD-10-CM

## 2017-06-15 PROCEDURE — 95816 EEG AWAKE AND DROWSY: CPT

## 2017-06-15 PROCEDURE — 95816 EEG AWAKE AND DROWSY: CPT | Performed by: PSYCHIATRY & NEUROLOGY

## 2017-06-17 LAB — BACTERIA SPEC AEROBE CULT: NORMAL

## 2017-07-17 ENCOUNTER — HOSPITAL ENCOUNTER (EMERGENCY)
Facility: HOSPITAL | Age: 72
Discharge: HOME | End: 2017-07-17
Attending: EMERGENCY MEDICINE | Admitting: EMERGENCY MEDICINE

## 2017-07-17 ENCOUNTER — APPOINTMENT (OUTPATIENT)
Dept: CT IMAGING | Facility: HOSPITAL | Age: 72
End: 2017-07-17

## 2017-07-17 VITALS
WEIGHT: 140 LBS | HEART RATE: 65 BPM | SYSTOLIC BLOOD PRESSURE: 112 MMHG | BODY MASS INDEX: 23.32 KG/M2 | RESPIRATION RATE: 18 BRPM | TEMPERATURE: 98.5 F | DIASTOLIC BLOOD PRESSURE: 90 MMHG | HEIGHT: 65 IN | OXYGEN SATURATION: 98 %

## 2017-07-17 DIAGNOSIS — R40.4 TRANSIENT ALTERATION OF AWARENESS: Primary | ICD-10-CM

## 2017-07-17 LAB
ALBUMIN SERPL-MCNC: 4 G/DL (ref 3.5–5.2)
ALBUMIN/GLOB SERPL: 1.3 G/DL
ALP SERPL-CCNC: 109 U/L (ref 39–117)
ALT SERPL W P-5'-P-CCNC: 11 U/L (ref 1–33)
ANION GAP SERPL CALCULATED.3IONS-SCNC: 14.9 MMOL/L
AST SERPL-CCNC: 13 U/L (ref 1–32)
BASOPHILS # BLD AUTO: 0.03 10*3/MM3 (ref 0–0.2)
BASOPHILS NFR BLD AUTO: 0.3 % (ref 0–1.5)
BILIRUB SERPL-MCNC: <0.2 MG/DL (ref 0.1–1.2)
BUN BLD-MCNC: 20 MG/DL (ref 8–23)
BUN/CREAT SERPL: 17.1 (ref 7–25)
CALCIUM SPEC-SCNC: 10.3 MG/DL (ref 8.6–10.5)
CHLORIDE SERPL-SCNC: 102 MMOL/L (ref 98–107)
CO2 SERPL-SCNC: 26.1 MMOL/L (ref 22–29)
CREAT BLD-MCNC: 1.17 MG/DL (ref 0.57–1)
DEPRECATED RDW RBC AUTO: 48.7 FL (ref 37–54)
EOSINOPHIL # BLD AUTO: 0.05 10*3/MM3 (ref 0–0.7)
EOSINOPHIL NFR BLD AUTO: 0.6 % (ref 0.3–6.2)
ERYTHROCYTE [DISTWIDTH] IN BLOOD BY AUTOMATED COUNT: 14.4 % (ref 11.7–13)
GFR SERPL CREATININE-BSD FRML MDRD: 55 ML/MIN/1.73
GLOBULIN UR ELPH-MCNC: 3.2 GM/DL
GLUCOSE BLD-MCNC: 120 MG/DL (ref 65–99)
GLUCOSE BLDC GLUCOMTR-MCNC: 120 MG/DL (ref 70–130)
HCT VFR BLD AUTO: 35.9 % (ref 35.6–45.5)
HGB BLD-MCNC: 11.2 G/DL (ref 11.9–15.5)
HOLD SPECIMEN: NORMAL
HOLD SPECIMEN: NORMAL
IMM GRANULOCYTES # BLD: 0.04 10*3/MM3 (ref 0–0.03)
IMM GRANULOCYTES NFR BLD: 0.5 % (ref 0–0.5)
LYMPHOCYTES # BLD AUTO: 1.86 10*3/MM3 (ref 0.9–4.8)
LYMPHOCYTES NFR BLD AUTO: 21.2 % (ref 19.6–45.3)
MAGNESIUM SERPL-MCNC: 2.2 MG/DL (ref 1.6–2.4)
MCH RBC QN AUTO: 28.9 PG (ref 26.9–32)
MCHC RBC AUTO-ENTMCNC: 31.2 G/DL (ref 32.4–36.3)
MCV RBC AUTO: 92.8 FL (ref 80.5–98.2)
MONOCYTES # BLD AUTO: 0.82 10*3/MM3 (ref 0.2–1.2)
MONOCYTES NFR BLD AUTO: 9.3 % (ref 5–12)
NEUTROPHILS # BLD AUTO: 5.99 10*3/MM3 (ref 1.9–8.1)
NEUTROPHILS NFR BLD AUTO: 68.1 % (ref 42.7–76)
PLATELET # BLD AUTO: 240 10*3/MM3 (ref 140–500)
PMV BLD AUTO: 10.2 FL (ref 6–12)
POTASSIUM BLD-SCNC: 4.6 MMOL/L (ref 3.5–5.2)
PROT SERPL-MCNC: 7.2 G/DL (ref 6–8.5)
RBC # BLD AUTO: 3.87 10*6/MM3 (ref 3.9–5.2)
SODIUM BLD-SCNC: 143 MMOL/L (ref 136–145)
TROPONIN T SERPL-MCNC: <0.01 NG/ML (ref 0–0.03)
VALPROATE SERPL-MCNC: 26 MCG/ML (ref 50–125)
WBC NRBC COR # BLD: 8.79 10*3/MM3 (ref 4.5–10.7)
WHOLE BLOOD HOLD SPECIMEN: NORMAL
WHOLE BLOOD HOLD SPECIMEN: NORMAL

## 2017-07-17 PROCEDURE — 36415 COLL VENOUS BLD VENIPUNCTURE: CPT

## 2017-07-17 PROCEDURE — 70450 CT HEAD/BRAIN W/O DYE: CPT

## 2017-07-17 PROCEDURE — 80053 COMPREHEN METABOLIC PANEL: CPT | Performed by: EMERGENCY MEDICINE

## 2017-07-17 PROCEDURE — 99285 EMERGENCY DEPT VISIT HI MDM: CPT

## 2017-07-17 PROCEDURE — 93005 ELECTROCARDIOGRAM TRACING: CPT

## 2017-07-17 PROCEDURE — 80164 ASSAY DIPROPYLACETIC ACD TOT: CPT | Performed by: EMERGENCY MEDICINE

## 2017-07-17 PROCEDURE — 83735 ASSAY OF MAGNESIUM: CPT | Performed by: EMERGENCY MEDICINE

## 2017-07-17 PROCEDURE — 82962 GLUCOSE BLOOD TEST: CPT

## 2017-07-17 PROCEDURE — 84484 ASSAY OF TROPONIN QUANT: CPT | Performed by: EMERGENCY MEDICINE

## 2017-07-17 PROCEDURE — 85025 COMPLETE CBC W/AUTO DIFF WBC: CPT | Performed by: EMERGENCY MEDICINE

## 2017-07-17 RX ORDER — DIVALPROEX SODIUM 125 MG/1
125 CAPSULE, COATED PELLETS ORAL 2 TIMES DAILY
COMMUNITY

## 2017-07-17 RX ORDER — SODIUM CHLORIDE 0.9 % (FLUSH) 0.9 %
10 SYRINGE (ML) INJECTION AS NEEDED
Status: DISCONTINUED | OUTPATIENT
Start: 2017-07-17 | End: 2017-07-17 | Stop reason: HOSPADM

## 2017-07-17 NOTE — ED NOTES
Brought in by ems from her ortho doctor's office for reported unresponsiveness. Pt reports that she was sleeping prior to staff waking her up. Is WC bound and non ambulatory. Is now A&O at baseline and in nad . DEnies pain     Griselda Vanessa RN  07/17/17 4670

## 2017-07-17 NOTE — ED PROVIDER NOTES
" EMERGENCY DEPARTMENT ENCOUNTER    CHIEF COMPLAINT  Chief Complaint: Syncope  History given by: Patient, NH  History limited by: Previous stroke  Room Number: 27/27  PMD: Jayashree Gilbert MD      HPI:  Pt is a 72 y.o. female who presents after reported syncopal episode PTA. Pt was leaving her ortho doctor's appointment and was found to be syncopal while waiting for transportation. Pt denies a CROOK, CP, and SOA. Pt denies any other pain or complaints at this time. Pt is currently on Eliquis.     Duration: PTA  Onset: Gradual  Timing: Constant  Location: N/A  Radiation: N/A  Quality: \"syncope\"  Intensity/Severity: Moderate  Progression: Worsening  Associated Symptoms: None  Aggravating Factors: None  Alleviating Factors: None  Previous Episodes: None  Treatment before arrival: None    PAST MEDICAL HISTORY  Active Ambulatory Problems     Diagnosis Date Noted   • Hip fracture, left 10/03/2016   • Hypertension 10/03/2016   • Syncope 11/09/2016     Resolved Ambulatory Problems     Diagnosis Date Noted   • No Resolved Ambulatory Problems     Past Medical History:   Diagnosis Date   • Arthritis    • Constipation    • Dementia    • Diabetes mellitus    • Fx. left wrist    • GERD (gastroesophageal reflux disease)    • High cholesterol    • Hypertension    • Hypokalemia    • Obstructive sleep apnea    • Pulmonary embolism    • Stroke 03/01/2016       PAST SURGICAL HISTORY  Past Surgical History:   Procedure Laterality Date   • APPENDECTOMY     • CARPAL TUNNEL RELEASE     • ENDOSCOPY N/A 1/17/2017    Procedure: ESOPHAGOGASTRODUODENOSCOPY WITH BIOPSIES;  Surgeon: Ignacio Johnson MD;  Location: [facility] ENDOSCOPY;  Service:    • FRACTURE SURGERY     • CO OPEN FIX INTER/SUBTROCH FX,IMPLNT Left 10/4/2016    Procedure: HIP INTERTROCHANTERIC NAILING;  Surgeon: Monica Sanchez MD;  Location: [facility] OR;  Service: Orthopedics       FAMILY HISTORY  Family History   Problem Relation Age of Onset   • Diabetes Mother        SOCIAL " HISTORY  Social History     Social History   • Marital status:      Spouse name: N/A   • Number of children: N/A   • Years of education: N/A     Occupational History   • Not on file.     Social History Main Topics   • Smoking status: Former Smoker     Types: Cigarettes     Start date: 10/3/1960     Quit date: 11/21/2015   • Smokeless tobacco: Not on file   • Alcohol use Not on file   • Drug use: Not on file   • Sexual activity: Not on file     Other Topics Concern   • Not on file     Social History Narrative       ALLERGIES  Review of patient's allergies indicates no known allergies.    REVIEW OF SYSTEMS  Review of Systems   Reason unable to perform ROS: Previous stroke.   Neurological: Positive for syncope.       PHYSICAL EXAM  ED Triage Vitals   Temp Heart Rate Resp BP SpO2   07/17/17 1314 07/17/17 1311 07/17/17 1311 07/17/17 1311 07/17/17 1311   97.9 °F (36.6 °C) 78 20 123/64 97 %      Temp src Heart Rate Source Patient Position BP Location FiO2 (%)   07/17/17 1311 -- -- -- --   Tympanic           Physical Exam   Constitutional: She is oriented to person, place, and time and well-developed, well-nourished, and in no distress. No distress.   Eyes: EOM are normal.   Neck: Normal range of motion.   Cardiovascular: Normal rate and regular rhythm.    No murmur heard.  Pulmonary/Chest: Effort normal and breath sounds normal. No respiratory distress.   O2 sats 100% on room air   Musculoskeletal:        Left knee: She exhibits swelling (mild and warmth). She exhibits no erythema.   Neurological: She is alert and oriented to person, place, and time. She has normal sensation. She displays weakness (mild of the L lower extremity).   Apparent aphasia that is questionably chronic   Skin: Skin is warm and dry.   Psychiatric: Affect normal.   Nursing note and vitals reviewed.      LAB RESULTS  Lab Results (last 24 hours)     Procedure Component Value Units Date/Time    CBC & Differential [[id number]] Collected:  07/17/17  1336    Specimen:  Blood Updated:  07/17/17 1348    Narrative:       The following orders were created for panel order CBC & Differential.  Procedure                               Abnormality         Status                     ---------                               -----------         ------                     CBC Auto Differential[[id number]]        Abnormal            Final result                 Please view results for these tests on the individual orders.    Comprehensive Metabolic Panel [[id number]]  (Abnormal) Collected:  07/17/17 1336    Specimen:  Blood Updated:  07/17/17 1436     Glucose 120 (H) mg/dL      BUN 20 mg/dL      Creatinine 1.17 (H) mg/dL      Sodium 143 mmol/L      Potassium 4.6 mmol/L      Chloride 102 mmol/L      CO2 26.1 mmol/L      Calcium 10.3 mg/dL      Total Protein 7.2 g/dL      Albumin 4.00 g/dL      ALT (SGPT) 11 U/L      AST (SGOT) 13 U/L      Alkaline Phosphatase 109 U/L      Total Bilirubin <0.2 mg/dL      eGFR  African Amer 55 (L) mL/min/1.73      Globulin 3.2 gm/dL      A/G Ratio 1.3 g/dL      BUN/Creatinine Ratio 17.1     Anion Gap 14.9 mmol/L     Narrative:       The MDRD GFR formula is only valid for adults with stable renal function between ages 18 and 70.    Magnesium [[id number]]  (Normal) Collected:  07/17/17 1336    Specimen:  Blood Updated:  07/17/17 1411     Magnesium 2.2 mg/dL     Troponin [[id number]]  (Normal) Collected:  07/17/17 1336    Specimen:  Blood Updated:  07/17/17 1436     Troponin T <0.010 ng/mL     Narrative:       Troponin T Reference Ranges:  Less than 0.03 ng/mL:    Negative for AMI  0.03 to 0.09 ng/mL:      Indeterminant for AMI  Greater than 0.09 ng/mL: Positive for AMI    CBC Auto Differential [[id number]]  (Abnormal) Collected:  07/17/17 1336    Specimen:  Blood Updated:  07/17/17 1348     WBC 8.79 10*3/mm3      RBC 3.87 (L) 10*6/mm3      Hemoglobin 11.2 (L) g/dL      Hematocrit 35.9 %      MCV 92.8 fL      MCH 28.9 pg      MCHC 31.2 (L) g/dL      RDW  14.4 (H) %      RDW-SD 48.7 fl      MPV 10.2 fL      Platelets 240 10*3/mm3      Neutrophil % 68.1 %      Lymphocyte % 21.2 %      Monocyte % 9.3 %      Eosinophil % 0.6 %      Basophil % 0.3 %      Immature Grans % 0.5 %      Neutrophils, Absolute 5.99 10*3/mm3      Lymphocytes, Absolute 1.86 10*3/mm3      Monocytes, Absolute 0.82 10*3/mm3      Eosinophils, Absolute 0.05 10*3/mm3      Basophils, Absolute 0.03 10*3/mm3      Immature Grans, Absolute 0.04 (H) 10*3/mm3     Valproic Acid Level, Total [[id number]]  (Abnormal) Collected:  07/17/17 1336    Specimen:  Blood Updated:  07/17/17 1525     Valproic Acid 26.0 (L) mcg/mL     POC Glucose Fingerstick [[id number]]  (Normal) Collected:  07/17/17 1344    Specimen:  Blood Updated:  07/17/17 1348     Glucose 120 mg/dL     Narrative:       Meter: [id number] : [id number] Belinda Nichols          I ordered the above labs and reviewed the results.    RADIOLOGY  CT Head Without Contrast   Preliminary Result   No convincing acute abnormality is seen. There is extensive   confluent low-density throughout the cerebral white matter consistent   with severe small vessel disease.  There is encephalomalacia tracking   the anterior and superior medial left frontal lobe, tracking up to 5 x 2   cm, likely an old left anterior cerebral artery territory infarct and   there is old lacunar infarcts in the left thalamus and anterior left   putamen. The remainder of the head CT is unremarkable. If there is   strong clinical suspicion of acute infarct an MRI of the head would have   to be obtained. The results were communicated to Dr. Kessler in the   emergency room by telephone 07/17/2017 at 3:30 PM.                I ordered the above noted radiological studies. Interpreted by radiologist. Reviewed by me in PACS.     EKG         EKG time: 1329  Rhythm/Rate: NSR at 70 bpm  P waves and AR: Normal  QRS, axis: Normal  ST and T waves: Normal  Interpreted contemporaneously by me and independently  viewed.  Unchanged compared to prior (2016).      PROCEDURES  Procedures      PROGRESS AND CONSULTS  ED Course   Comment By Time   3:06 PM  73 yo with prior CVA (L weakness) and dementia sent to ER for unresponsive spell while at doctor's appt.  Pt has no complaints currently although somewhat confused with dementia.  Exam shows no obvious focal deficit (mild LLE weakness - likely old stroke).  Speech is slow which may be related to dementia.  Discussed situation with DTR who is unable to come to ER at this time.  Not team D cand due to severity. Jaswinder Kessler MD 07/17 1508     2:55 PM:  Vitals: BP: 101/48 HR: 65 Temp: 97.9 °F (36.6 °C) (Tympanic) O2 sat: 97%  D/w pt plan for head CT, EKG, and labs for further evaluation.    3:04 PM:  Discussed with pt's POA Leodan Whitman the possibility of coming to the ED now and she states that she is unavailable to come to the ED right now due to legal matters.     3:48 PM:  Vitals: BP: 101/48 HR: 65 Temp: 97.9 °F (36.6 °C) (Tympanic) O2 sat: 97%  Rechecked pt. Pt is resting comfortably. Discussed with pt plan to call Leodan again.     3:49 PM:  Discussed negative head CT results and labs with pt's POA Leodan Whitman. Discussed with the pt's POA plan for discharge. Pt understands and agrees with the plan, all questions answered.      MEDICAL DECISION MAKING  Results were reviewed/discussed with the patient and they were also made aware of online access. Pt also made aware that some labs, such as cultures, will not be resulted during ER visit and follow up with PMD is necessary.     MDM  Number of Diagnoses or Management Options     Amount and/or Complexity of Data Reviewed  Clinical lab tests: ordered and reviewed  Tests in the radiology section of CPT®: ordered and reviewed  Tests in the medicine section of CPT®: ordered and reviewed  Decide to obtain previous medical records or to obtain history from someone other than the patient: yes           DIAGNOSIS  Final diagnoses:    Transient alteration of awareness       DISPOSITION  1556- DISCHARGE    Patient discharged in stable condition.    Reviewed implications of results, diagnosis, meds, responsibility to follow up, warning signs and symptoms of possible worsening, potential complications and reasons to return to ER.    Patient/Family voiced understanding of above instructions.    Discussed plan for discharge, as there is no emergent indication for admission.  Pt/family is agreeable and understands need for follow up and repeat testing.  Pt is aware that discharge does not mean that nothing is wrong but it indicates no emergency is present that requires admission and they must continue care with follow-up as given below or physician of their choice.     FOLLOW-UP  Jayashree Gilbert MD  [address]  [place] [address]  [phone]               Medication List      Stop          metoclopramide 10 MG tablet   Commonly known as:  REGLAN           Latest Documented Vital Signs:  As of 3:56 PM  BP- 101/48 HR- 65 Temp- 97.9 °F (36.6 °C) (Tympanic) O2 sat- 97%    --  Documentation assistance provided by karl Marks for Jaswinder Kessler MD.  Information recorded by the scribe was done at my direction and has been verified and validated by me.       Pato Marks  07/17/17 6965       Jaswinder Kessler MD  07/17/17 2725

## 2017-08-01 ENCOUNTER — OFFICE VISIT (OUTPATIENT)
Dept: CARDIOLOGY | Facility: CLINIC | Age: 72
End: 2017-08-01

## 2017-08-01 VITALS
SYSTOLIC BLOOD PRESSURE: 96 MMHG | DIASTOLIC BLOOD PRESSURE: 60 MMHG | WEIGHT: 140 LBS | HEIGHT: 65 IN | HEART RATE: 76 BPM | BODY MASS INDEX: 23.32 KG/M2

## 2017-08-01 DIAGNOSIS — R55 SYNCOPE, UNSPECIFIED SYNCOPE TYPE: Primary | ICD-10-CM

## 2017-08-01 DIAGNOSIS — I95.2 HYPOTENSION DUE TO DRUGS: ICD-10-CM

## 2017-08-01 DIAGNOSIS — I10 ESSENTIAL HYPERTENSION: Chronic | ICD-10-CM

## 2017-08-01 PROCEDURE — 93000 ELECTROCARDIOGRAM COMPLETE: CPT | Performed by: INTERNAL MEDICINE

## 2017-08-01 PROCEDURE — 99203 OFFICE O/P NEW LOW 30 MIN: CPT | Performed by: INTERNAL MEDICINE

## 2017-08-01 RX ORDER — AMLODIPINE BESYLATE 5 MG/1
5 TABLET ORAL DAILY
COMMUNITY

## 2017-08-01 NOTE — PROGRESS NOTES
Subjective:     Encounter Date:08/01/2017      Patient ID: Rachel Whitman is a 72 y.o. female.    Chief Complaint:  History of Present Illness    Dear Dr. Solis,    I had the pleasure of seeing this patient in the office today for initial consultation.  I appreciate the you sent her to see us.    She is a pleasant female who has a history of CVA and Dementia.  Recently, she had a visit at the orthopedic surgeon, and while she was waiting in her wheelchair after leaving the visit she was found to be unresponsive.  She apparently told him that she thought she fell asleep.  She was taken to the emergency room but no significant abnormality was seen.    Patient has difficulty communicating but can respond to short questions with yes or no answers.  She denies any chest pain then or at any time.  She denies any sensation of palpitations or heart racing.  No complaints of shortness of breath.  She does note that at times she feels some dizziness.    She had an echocardiogram performed in November that was normal.  Telemetry monitoring in the emergency room was normal.  Her blood pressure at that time was 112 systolic.  Her heart rate was 65.    Echo 11/2016:  Interpretation Summary   · All left ventricular wall segments contract normally.  · Left atrial cavity size is borderline dilated.  · Mild-to-moderate mitral valve regurgitation is present  · Mild to moderate tricuspid valve regurgitation is present.  · Mild pulmonic valve regurgitation is present.  · Left Ventricle: Calculated EF = 55.2%  · There is no evidence of pericardial effusion.       The following portions of the patient's history were reviewed and updated as appropriate: allergies, current medications, past family history, past medical history, past social history, past surgical history and problem list.    Past Medical History:   Diagnosis Date   • Anemia    • Aphasia    • Arthritis    • Constipation    • COPD (chronic obstructive pulmonary disease)     • CTS (carpal tunnel syndrome)    • Dementia    • Diabetes mellitus    • Episode of change in speech    • Epistaxis    • Fx. left wrist    • GERD (gastroesophageal reflux disease)    • High cholesterol    • Hip fracture, left    • Hx of blood clots    • Hyperlipidemia    • Hypertension    • Hypokalemia    • Joint pain    • Lymphedema    • Malaise and fatigue    • OA (osteoarthritis)    • Obstructive sleep apnea    • Pulmonary embolism    • Right hemiparesis    • Seizure    • Stroke 03/01/2016   • Swelling of left knee joint    • Syncope    • TIA (transient ischemic attack)    • UTI (urinary tract infection)    • Vitamin B12 deficiency    • Vitamin D deficiency        Past Surgical History:   Procedure Laterality Date   • APPENDECTOMY     • CARPAL TUNNEL RELEASE     • ENDOSCOPY N/A 1/17/2017    Procedure: ESOPHAGOGASTRODUODENOSCOPY WITH BIOPSIES;  Surgeon: Ignacio Johnson MD;  Location: [facility] ENDOSCOPY;  Service:    • FRACTURE SURGERY     • HIP SURGERY     • OR OPEN FIX INTER/SUBTROCH FX,IMPLNT Left 10/4/2016    Procedure: HIP INTERTROCHANTERIC NAILING;  Surgeon: Monica Sanchez MD;  Location: [facility] MAIN OR;  Service: Orthopedics       Social History     Social History   • Marital status:      Spouse name: N/A   • Number of children: N/A   • Years of education: N/A     Occupational History   • Not on file.     Social History Main Topics   • Smoking status: Former Smoker     Packs/day: 0.25     Years: 55.00     Types: Cigarettes     Start date: 10/3/1960     Quit date: 11/21/2015   • Smokeless tobacco: Not on file   • Alcohol use No      Comment: caffeine use   • Drug use: No   • Sexual activity: Not on file     Other Topics Concern   • Not on file     Social History Narrative       Review of Systems   Constitution: Positive for malaise/fatigue. Negative for chills, decreased appetite, fever and night sweats.   HENT: Negative for ear discharge, ear pain, hearing loss, nosebleeds and stridor.   "  Eyes: Negative for discharge.   Cardiovascular: Negative for cyanosis.   Respiratory: Negative for hemoptysis and sputum production.    Endocrine: Negative for cold intolerance and heat intolerance.   Hematologic/Lymphatic: Negative for adenopathy.   Skin: Negative for dry skin, nail changes, poor wound healing, rash and suspicious lesions.   Musculoskeletal: Positive for arthritis and muscle weakness. Negative for gout, muscle cramps, myalgias and neck pain.   Gastrointestinal: Negative for anorexia, bowel incontinence, constipation, diarrhea, dysphagia, hematemesis and jaundice.   Genitourinary: Negative for dysuria, flank pain, frequency, hematuria and nocturia.   Neurological: Positive for difficulty with concentration, disturbances in coordination, focal weakness and tremors.   Psychiatric/Behavioral: Positive for altered mental status and memory loss. The patient is nervous/anxious.    Allergic/Immunologic: Negative for persistent infections.         ECG 12 Lead  Date/Time: 8/1/2017 12:44 PM  Performed by: BRI MILLER III.  Authorized by: BRI MILLER III   Comparison: compared with previous ECG   Similar to previous ECG  Rhythm: sinus rhythm  Rate: normal  Conduction: conduction normal  ST Segments: ST segments normal  T Waves: T waves normal  QRS axis: normal  Other: no other findings  Clinical impression: normal ECG               Objective:     Vitals:    08/01/17 0935 08/01/17 1236   BP: 118/64 96/60   BP Location: Left arm    Pulse: 76    Weight: 140 lb (63.5 kg)    Height: 65\" (165.1 cm)          Physical Exam   Constitutional: She is oriented to person, place, and time. She appears well-developed and well-nourished. No distress.   HENT:   Head: Normocephalic and atraumatic.   Nose: Nose normal.   Mouth/Throat: Oropharynx is clear and moist.   Eyes: Conjunctivae and EOM are normal. Pupils are equal, round, and reactive to light. Right eye exhibits no discharge. Left eye exhibits no discharge. "   Neck: Normal range of motion. Neck supple. No tracheal deviation present. No thyromegaly present.   Cardiovascular: Normal rate, regular rhythm, S1 normal, S2 normal, normal heart sounds and normal pulses.  Exam reveals no S3.    Pulmonary/Chest: Effort normal and breath sounds normal. No stridor. No respiratory distress. She exhibits no tenderness.   Abdominal: Soft. Bowel sounds are normal. She exhibits no distension and no mass. There is no tenderness. There is no rebound and no guarding.   Musculoskeletal: Normal range of motion. She exhibits no tenderness or deformity.   Lymphadenopathy:     She has no cervical adenopathy.   Neurological: She is alert and oriented to person, place, and time. She has normal reflexes.   Skin: Skin is warm and dry. No rash noted. She is not diaphoretic. No erythema.   Psychiatric: She has a normal mood and affect. Thought content normal.       Lab Review:             Performed        Assessment:          Diagnosis Plan   1. Syncope, unspecified syncope type  ECG 12 Lead   2. Essential hypertension     3. Hypotension due to drugs            Plan:       This patient had an episode recently  of ?syncope while sitting in a wheelchair.  Evaluation in the emergency room was unremarkable, and recent echocardiogram was normal.  The patient was actually relatively hypotensive today at her visit.  I believe this is likely the etiology for her episode.  We will decrease her amlodipine from 10 mg daily to 5 mg daily.  If she has further issues, or if I can help in any way in the future, please let me know.    Thank you very much for allowing us to participate in the care of this pleasant patient.  Please don't hesitate to call if I can be of assistance in any way.      Current Outpatient Prescriptions:   •  Acetaminophen (TYLENOL PO), Take  by mouth., Disp: , Rfl:   •  amLODIPine (NORVASC) 5 MG tablet, Take 5 mg by mouth Daily., Disp: , Rfl:   •  apixaban (ELIQUIS) 5 MG tablet tablet, Take  5 mg by mouth 2 (Two) Times a Day., Disp: , Rfl:   •  aspirin 81 MG EC tablet, Take 81 mg by mouth Daily., Disp: , Rfl:   •  baclofen (LIORESAL) 10 MG tablet, Take 10 mg by mouth Daily., Disp: , Rfl:   •  dextromethorphan-quinidine (NUEDEXTA) 20-10 MG capsule capsule, Take  by mouth Every 12 (Twelve) Hours., Disp: , Rfl:   •  Divalproex Sodium (DEPAKOTE SPRINKLE) 125 MG capsule, Take 125 mg by mouth 2 (Two) Times a Day., Disp: , Rfl:   •  ferrous sulfate 325 (65 FE) MG tablet, Take 325 mg by mouth Daily With Breakfast., Disp: , Rfl:   •  Lactobacillus (ACIDOPHILUS PO), Take  by mouth 2 (Two) Times a Day., Disp: , Rfl:   •  losartan (COZAAR) 100 MG tablet, Take 100 mg by mouth Daily., Disp: , Rfl:   •  pantoprazole (PROTONIX) 40 MG EC tablet, Take 40 mg by mouth Daily., Disp: , Rfl:   •  sennosides-docusate sodium (SENOKOT-S) 8.6-50 MG tablet, Take 1 tablet by mouth Daily., Disp: , Rfl:

## 2020-01-16 NOTE — PLAN OF CARE
Problem: Patient Care Overview (Adult)  Goal: Plan of Care Review  Outcome: Ongoing (interventions implemented as appropriate)    01/17/17 0714   Coping/Psychosocial Response Interventions   Plan Of Care Reviewed With patient   Patient Care Overview   Progress improving       Goal: Adult Individualization and Mutuality  Outcome: Ongoing (interventions implemented as appropriate)  Goal: Discharge Needs Assessment  Outcome: Ongoing (interventions implemented as appropriate)    01/17/17 0714   Discharge Needs Assessment   Concerns To Be Addressed no discharge needs identified   Discharge Facility/Level Of Care Needs nursing facility, skilled   Living Environment   Transportation Available ambulance         Problem: GI Endoscopy (Adult)  Intervention: Monitor/Manage Procedure Recovery    01/17/17 0714   Respiratory Interventions   Airway/Ventilation Management airway patency maintained   Activity   Activity Type activity adjusted per tolerance   Cardiac Interventions   Warming Thermoregulation Maintenance warm blankets applied       Intervention: Prevent Yolanda-procedural Injury    01/17/17 0714   Positioning   Positioning side lying, left   Head Of Bed (HOB) Position HOB elevated         Goal: Signs and Symptoms of Listed Potential Problems Will be Absent or Manageable (GI Endoscopy)  Outcome: Ongoing (interventions implemented as appropriate)    01/17/17 0714   GI Endoscopy   Problems Assessed (GI Endoscopy) all   Problems Present (GI Endoscopy) none           
TENDERNESS/PAIN
